# Patient Record
Sex: FEMALE | Race: WHITE | URBAN - METROPOLITAN AREA
[De-identification: names, ages, dates, MRNs, and addresses within clinical notes are randomized per-mention and may not be internally consistent; named-entity substitution may affect disease eponyms.]

---

## 2019-04-18 ENCOUNTER — HISTORICAL (OUTPATIENT)
Dept: ADMINISTRATIVE | Facility: HOSPITAL | Age: 64
End: 2019-04-18

## 2019-05-30 ENCOUNTER — HISTORICAL (OUTPATIENT)
Dept: ADMINISTRATIVE | Facility: HOSPITAL | Age: 64
End: 2019-05-30

## 2019-07-11 ENCOUNTER — HISTORICAL (OUTPATIENT)
Dept: ADMINISTRATIVE | Facility: HOSPITAL | Age: 64
End: 2019-07-11

## 2019-08-20 ENCOUNTER — HISTORICAL (OUTPATIENT)
Dept: ADMINISTRATIVE | Facility: HOSPITAL | Age: 64
End: 2019-08-20

## 2021-10-13 ENCOUNTER — HISTORICAL (OUTPATIENT)
Dept: RADIOLOGY | Facility: HOSPITAL | Age: 66
End: 2021-10-13

## 2022-04-10 ENCOUNTER — HISTORICAL (OUTPATIENT)
Dept: ADMINISTRATIVE | Facility: HOSPITAL | Age: 67
End: 2022-04-10

## 2022-04-26 VITALS
SYSTOLIC BLOOD PRESSURE: 127 MMHG | WEIGHT: 133.38 LBS | DIASTOLIC BLOOD PRESSURE: 78 MMHG | BODY MASS INDEX: 23.63 KG/M2 | OXYGEN SATURATION: 99 % | HEIGHT: 63 IN

## 2022-05-02 NOTE — HISTORICAL [facility]ER
This is a historical note converted from Leigh. Formatting and pictures may have been removed.  Please reference Leigh for original formatting and attached multimedia. Chief Complaint  Est patient here with Low back pain x1 week after bending down wrong and having radiating pain into leg. Better now. Xrays today. Has had back pain in past.  History of Present Illness  65yo?RHD?female?non-smoker hx of hypothyoidism and osteoporosis?p[facility]?for?initial?visit?for low back pain?x 1wk.??She bent over wrong and has some radiating pain to the R?lower leg posteriorly.? She has had a similar episode in the past with numbness into her toes resolved with exercise and Advil.? She states she is much better over the past week and has almost completely resolved.? Corin bowel or bladder incont.? No saddle anesthesia.  DOI: 1wk  Occupation: gardening is her hobby  CRAIG: as above  Previously seen by: Dr. PADDY Morgan for adhesive capsulitis, went to [facility] and was given Celebrex, Flexeril, and medrol dose kanwal  Previous treatment:?Celebrex with moderate relief  Previous injuries:?denies  Fam Hx of Arthritis:?no  Current pain level: ?3/10,?sharp in AM, worsened with use, and alleviated with walking and standing up.  Associated Symptoms:?no numbness or tingling;?no swelling;?no skin changes:?no weakness;?no decrease in ROM  Review of Systems  Constitutional: no fever, no chills, no weight loss  CV: no swelling, no edema  Resp: no SOB, wheezing  GI: no urinary retention, no urinary incontinence  : no fecal incontinence  Skin: no rash, no wound  Neuro: no numbness/tingling, no weakness, no saddle anesthesia  MSK: as above  Psych: no depression, no anxiety  Heme/Lymph: no easy bruising, no easy bleeding, no lymphadenopathy  Immuno: no MRSA history  Physical Exam  Vitals & Measurements  BP:?128/91?  HT:?160?cm? WT:?62.77?kg? BMI:?24.52?  Inspection: no swelling, no erythema, no rashes  ROM: FROM active?and  passive in flexion, ext, SB, Rot  Palpation:  Lumbar: TTP at paraspinal mm. ?left  Sensation: NVI L2-S1  ?   Special Tests:  Seated Straight Leg: Right-neg; Left:+  Supine Straight Leg Raise: Right-neg; Left:+  Slump Straight Leg: Right-neg; Left:neg  MAREK: Right-neg; Left:neg  FADIR: Right-neg; Left:neg  Moses Test: Right-neg; Left:neg  Obers: Right-neg; Left:neg  ?  General: well developed;well nourished; cooperative  PSYCH: alert and oriented x 3?with?appropriate mood and affect  SKIN: inspection and palpation of skin and soft tissue normal; no scars noted on upper/lower extremities  CV: vascular integrity noted; +2 symmetrical pulses, no edema  ABDOMEN:?soft, nontender, ND, + BS x 4, no palpable masses  NEURO: sensation intact by light touch; DTRs +2 bilateral and symmetrical  LYMPH: no LAD noted  Assessment/Plan  Low back pain?M54.5  - Radiological studies ordered and reviewed by me, interpretation attached  -?Advised to start doing back exercises with PT as she is already seeing them for R adhesive capsulitis  - Activity as tolerated  - PT x 12wks, 3 times weekly, HEP,?cont?NSAIDs/Tyl prn, Ice/Heat prn  - Follow up in 2months for repeat assessment and possible OMT  Ordered:  XR Spine Lumbar Complete W Flexion/Ext, Routine, 05/30/19 7:59:00 CDT, Other (please specify), None, Ambulatory, Rad Type, Low back pain, Not Scheduled, 05/30/19 7:59:00 CDT  ?   Problem List/Past Medical History  Ongoing  Adhesive capsulitis of right shoulder  Osteoporosis  Historical  No qualifying data  Procedure/Surgical History  Stapedectomy (2000)  Thyroidectomy (2000)   Medications  acetaminophen-hydrocodone 325 mg-10 mg oral tablet, 1 tab(s), Oral, QID,? ?Not taking  CeleBREX 200 mg oral capsule, 200 mg= 1 cap(s), Oral, Daily  cyclobenzaprine 10 mg oral tablet, 10 mg= 1 tab(s), Oral, TID, PRN  levothyroxine 112 mcg (0.112 mg) oral tablet, 112 mcg= 1 tab(s), Oral, Daily  levothyroxine 125 mcg (0.125 mg) oral tablet, 125 mcg= 1  tab(s), Oral, Daily,? ?Not taking: Last Dose Date/Time Unknown. Changed dosage  Medrol Dosepak 4 mg oral tablet, 1 packet(s), Oral, As Directed,? ?Not Taking, Completed Rx  Prolia SC Injection, 60 mg, Subcutaneous, q6mo  Allergies  tetracycline?(Rash)  Social History  Alcohol  Current, Beer, Wine, Liquor, 1-2 times per week, 04/18/2019  Substance Abuse  Never, 04/18/2019  Tobacco  Former smoker, quit more than 30 days ago, No, 05/30/2019  Family History  Cancer: Mother.  Congestive heart disease.: Negative: Mother and Father.  Hypertension.: Mother.  Diagnostic Results  XR L Spine 5/30/19:  No acute fracture or dislocation present.? L5-S1 disc narrowing.

## 2022-05-02 NOTE — HISTORICAL [facility]
This is a historical note converted from Leigh. Formatting and pictures may have been removed.  Please reference Leigh for original formatting and attached multimedia. Chief Complaint  PT HERE FOR RT SHOULDER PAIN PRESENT FOR 4 MONTHS NOW. PT STATES SHE NOTICED PAIN WHEN SHE WAS SWIMMING ONE DAY HOLDING ONTO A BOARD WITH ARMS EXTENDED. X-RAY TODAY.. PT STATES NO PREVIOUS TREATMENT. SHE HAS BEEN DIAGNOSED WITH OSTEOPOROSIS TAKES PROLIA.  History of Present Illness  Pain getting WORST?? Pain right Shoulder laterally ?? No fever ?? No chills  ?? Pain in the right shoulder in the subacromial region ?? In the supraspinatus region ?? When actively and passively moved ?? ?? Started gradually ?? Slowly worsens with extended activity ?? Worsens at night ?? Causing an inability to sleep ?? Causes difficulty lying on affected side ?? Feels better after rest ?? Not relieved by medication ?? Shoulder joint stiffness on the right ?? joint stiffness ???? No radicular arm pain ?? No right sternoclavicular joint pain ?? No right shoulder joint swelling ?? No pain in the acromioclavicular  ? ?? No tingling of the right shoulder ?? No right shoulder numbness  ?? Range of motion was abnormal difficulty reaching over head using right arm ?? Range of motion was abnormal difficulty combing hair using right arm ?? Range of motion was abnormal difficulty taking shirt on/off using right arm  ?? No neck pain on right Percervical pain ?? Not in the trapezius Trapezius pain  Pain is significantly effecting quality of life  Review of Systems  Review of Systems?  ?????Constitutional: ?No fever, No chills. ?  ?????Respiratory: ?No shortness of breath, No cough. ?  ?????Cardiovascular: ?No chest pain. ?  ?????Gastrointestinal: ?No nausea, No vomiting, No diarrhea, No constipation, No heartburn. ?  ?????Genitourinary: ?No dysuria, No hematuria. ?  ?????Hematology/Lymphatics: ?No bleeding tendency. ?  ?????Endocrine: ?No polyuria.  ?  ?????Neurologic: ?Alert and oriented X4, No numbness, No tingling. ?  ?????Psychiatric: ?No anxiety, No depression. ?  ?????Integumentary: no abnormalities except as noted in history of present illness  Physical Exam  Vitals & Measurements  HR:?78(Peripheral)? BP:?130/91?  HT:?160?cm? WT:?60.78?kg? BMI:?23.74?  ?????????  PHYSICAL FINDING  ??  Neck:  ??NOTED??Pain radiating to the shoulder Precervical  ???  Musculoskeletal:  ??NO?swelling of the right acromioclavicular joint.  ??NO?swelling of the left acromioclavicular joint.  ???  General Appearance:  ??In?NO?acute distress.  ???  Eyes:  General/bilateral:  Sclera:???Normal.  ???  Ears:  General/bilateral:  Outer Ear:???Normal.  ???  Lungs:  ??Respiratory excursion normal and symmetric.  ???  Cardiovascular:  Heart Rate and Rhythm:???Normal.  Arterial Pulses: ? Ulnar pulses 1+ right. ? Radial pulse 1+ right. ? Radial pulse 1+ left.  ???  Abdomen:  ??Normal.  ???  ???  Musculoskeletal System:  ???  Shoulder:  ?   ??NO atrophy of the deltoid muscle  ??NO atrophy of the supraspinatus muscle  ??NO atrophy of the infraspinatus muscle  ?  ???  Right Shoulder:??. ? Acromial tenderness on palpation. ? Tenderness on palpation of the subacromial bursa. ? Tenderness on palpation of the deltoid muscle. ? Tenderness on palpation of the supraspinatus muscle. ? Tenderness on palpation of the infraspinatus muscle. ? Tenderness on palpation of the glenohumeral joint region. ? Tenderness on palpation at the bicipital groove. ? Tenderness on palpation of the trapezius muscle. ? Subacromial crepitus on motion was noted.  ???  Right Shoulder:  ???  Shoulder Motion:??????????????Value?????????Normal Range  ???  Active abduction????????????????80 degrees  Active forward flexion????????????????140 degrees  Active external rotation???????????????30 degrees  Active internal rotation???????????????20 degrees  ???  ??NO swelling medial sternoclavicular.  ??NO swelling.  ??NO  induration.  ??NO erythema.  ??NO long head biceps deformity.  ??winged scapula on the right  ??Motion was abnormal.  ??pain was elicited during a Neer impingement test.  ? pain was elicited during a Montemayor-Igor impingement test.  ??????  ???????????????????????????????????????????????????????????????????????????????  Clavicle:  ???  General/bilateral:???Acromioclavicular joint showed NO pain elicited by motion distal right.  ???  Right Clavicle:?? Right sternoclavicular joint showed tenderness on palpation. ? Right acromioclavicular joint showed tenderness on palpation.  ???  Left Clavicle:?? Left sternoclavicular joint showed tenderness on palpation. ? Left acromioclavicular joint showed tenderness on palpation.  ???  ???  Neurological:  ???  ? NO abnormalities were noted Sensibility intact distally on right.  ??Oriented to time, place, and person.  ???  Sensation:  ??NO sensory exam abnormalities were noted Decreased median sensation.  ??NO sensory exam abnormalities were noted Decreased ulnar sensation.  ??NO sensory exam abnormalities were noted Decreased radial sensation.  ???  Motor (Strength):  ? weakness of the right shoulder was observed.  ??????????????????????????????????????????????????????????????????????????????  Skin:  ??NO ecchymosis on the shoulders left.  ??NO ecchymosis on the shoulders right.  ???  Injury / Incision Site:???NO scar.  ???  TESTS  ???  Imaging:  ???  X-Ray Shoulder:?Complete, two or more views of the true AP of the glenohumeral joint were performed??????????????????????????????????????????????????????????????????????????????????  NO radiographic evidence of any osteoarticular abnormality????????????????????????????????????????????????????????????????????????????????????????????????????????????????????????????????????????????????????????????  ?   ASSESSMENT  ?   ? Partial tear of rotator cuff tendon  ? Adhesive capsulitis of shoulder  ???  ???  PLAN  ?   ? Physical therapy  service  ? Home exercises  ?  NSAIDS  right shoulder injection  Administered corticosteroid injection?? ? 2cc cortisone and 2cc lidocaine using sterile technique after informed verbal consent. Risks discussed with patient prior to injection. Informed the patient of the following:  -?Explained to patient that this injection in some patients will experience increased pain a few minutes after the injection and that the pain will last anywhere from 10 to 20 minutes. In order to decrease the pain you need to do the following:  ?Make sure to move the extremity in which the injection was given. If you do not move the medication sits there and can cause more pain.  Ice the affected joint every 2 hours for 20 minutes at a time for the next 24 hours.  ?If you are not already taking an anti-inflammatory take the following Ibuprofen/ Advil take 3 to 4 tablets every 6 to 8 hours or 2 Aleve every 12 hours for the next 5 days to help the medication work. If you cannot take anti-inflammatory take 2 extra strength Tylenol every 6 hours for the next 5 days.  ??Patient voiced understanding ?????????????????????????????????????????????????????????????????????????????  Assessment/Plan  1.?Adhesive capsulitis of right shoulder?M75.01  Ordered:  betamethasone, 12 mg, Intra-Articular, Once, first dose 04/18/19 10:00:00 CDT, stop date 04/18/19 10:00:00 CDT  Lidocaine inj., 2 mL, Intra-Articular, Once, first dose 04/18/19 9:15:00 CDT, stop date 04/18/19 9:15:00 CDT  asp/inj jnt/bursa, major [id number] PC, 04/18/19 9:15:00 CDT, [facility]opa[facility]ics Clinic, Routine, 04/18/19 9:15:00 CDT  Clinic Follow up, *Est. 05/21/19 10:00:00 CDT, Order for future visit, Adhesive capsulitis of right shoulder, Orthopaedics  Office/Outpatient Visit Level 3 New [id number] PC, Adhesive capsulitis of right shoulder, Orthopaedics Clinic, 04/18/19 9:14:00 CDT  PT/OT External Referral, 04/18/19 9:15:00 CDT, Adhesive capsulitis of right shoulder, Modalities  No Dry Needling   Stretch and Strengthen  Therapeutic Excercise, 3 X Week, **SHOULDER PT ORDERS**  ?  Orders:  XR Shoulder Right Minimum 2 Views, Routine, 04/18/19 9:02:00 CDT, Pain, None, Ambulatory, Rad Type, Pain in right shoulder, 04/18/19 9:02:00 CDT   Problem List/Past Medical History  Ongoing  Adhesive capsulitis of right shoulder  Osteoporosis  Historical  No qualifying data  Procedure/Surgical History  Stapedectomy (2000)  Thyroidectomy (2000)   Medications  Celestone, 12 mg, Intra-Articular, Once  levothyroxine 125 mcg (0.125 mg) oral tablet, 125 mcg= 1 tab(s), Oral, Daily  lidocaine 2% injectable solution, 2 mL, Intra-Articular, Once  Allergies  tetracycline?(Rash)  Social History  Alcohol  Current, Beer, Wine, Liquor, 1-2 times per week, 04/18/2019  Substance Abuse  Never, 04/18/2019  Tobacco  Former smoker, quit more than 30 days ago, N/A, Started age 18 Years. Stopped age 40 Years., 04/18/2019  Family History  Cancer: Mother.  Congestive heart disease.: Negative: Mother and Father.  Hypertension.: Mother.  Health Maintenance  Health Maintenance  ???Pending?(in the next year)  ??? ??Due?  ??? ? ? ?ADL Screening due??04/18/19??and every 1??year(s)  ??? ? ? ?Alcohol Misuse Screening due??04/18/19??and every 1??year(s)  ??? ? ? ?Aspirin Therapy for CVD Prevention due??04/18/19??and every 1??year(s)  ??? ? ? ?Tetanus Vaccine due??04/18/19??and every 10??year(s)  ??? ? ? ?Zoster Vaccine due??04/18/19??and every 100??year(s)  ???Satisfied?(in the past 1 year)  ??? ??Satisfied?  ??? ? ? ?Blood Pressure Screening on??04/18/19.??Satisfied by Gissel Pierre LPN  ??? ? ? ?Body Mass Index Check on??04/18/19.??Satisfied by Gissel Pierre LPN  ??? ? ? ?Cervical Cancer Screening on??07/16/18.??Satisfied by Breanna Orozco  ??? ? ? ?Obesity Screening on??04/18/19.??Satisfied by Gissel Pierre LPN  ?  ?

## 2022-05-02 NOTE — HISTORICAL [facility]
This is a historical note converted from Leigh. Formatting and pictures may have been removed.  Please reference Leigh for original formatting and attached multimedia. Chief Complaint  c/o bilateral shoulder pain, left shoulder started hurting about 3 weeks ago, denies any prior sx or injury, xrays today....sm  History of Present Illness  ????  ? Pain getting WORST? Pain left Shoulder laterally ? No fever ? No chills  ? Pain in the left shoulder in the subacromial region ? In the supraspinatus region ? When actively and passively moved ? ? Started gradually ? Slowly worsens with extended activity ? Worsens at night ? Causing an inability to sleep ? Causes difficulty lying on affected side ? Feels better after rest ? Not relieved by medication ? Shoulder joint stiffness on the left ? joint stiffness ? ?? No radicular arm pain ? No left sternoclavicular joint pain ? No left shoulder joint swelling ? No pain in the acromioclavicular  ? No tingling of the left shoulder ? No left shoulder numbness  ? Range of motion was abnormal difficulty reaching over head using left arm ? Range of motion was abnormal difficulty combing hair using left arm ? Range of motion was abnormal difficulty taking shirt on/off using left arm  ? No neck pain on left Precervical pain ? Not in the trapezius Trapezius pain  Pain is significantly effecting quality of life  ?  she had a previous right frozen shoulder in the recent past  Review of Systems  Review of Systems?  ?????Constitutional: ?No fever, No chills. ?  ?????Respiratory: ?No shortness of breath, No cough. ?  ?????Cardiovascular: ?No chest pain. ?  ?????Gastrointestinal: ?No nausea, No vomiting, No diarrhea, No constipation, No heartburn. ?  ?????Genitourinary: ?No dysuria, No hematuria. ?  ?????Hematology/Lymphatics: ?No bleeding tendency. ?  ?????Endocrine: ?No polyuria. ?  ?????Neurologic: ?Alert and oriented X4, No numbness, No tingling. ?  ?????Psychiatric: ?No anxiety, No  depression. ?  ?????Integumentary: no abnormalities except as noted in history of present illness  Physical Exam  Vitals & Measurements  BP:?127/78?  HT:?160?cm? WT:?60.5?kg? BMI:?23.63?  ?????????  PHYSICAL FINDING  ??  Neck:  ??NOTED??Pain radiating to the shoulder Precervical  ???  Musculoskeletal:  ??NO?swelling of the right acromioclavicular joint.  ??NO?swelling of the left acromioclavicular joint.  ???  General Appearance:  ??In?NO?acute distress.  ???  Eyes:  General/bilateral:  Sclera:???Normal.  ???  Ears:  General/bilateral:  Outer Ear:???Normal.  ???  Lungs:  ??Respiratory excursion normal and symmetric.  ???  Cardiovascular:  Heart Rate and Rhythm:???Normal.  Arterial Pulses: ? Ulnar pulses 1+ right. ? Radial pulse 1+ right. ? Radial pulse 1+ left.  ???  Abdomen:  ??Normal.  ???  ???  Musculoskeletal System:  ???  Shoulder:  ?.  ??NO atrophy of the deltoid muscle  ??NO atrophy of the supraspinatus muscle  ??NO atrophy of the infraspinatus muscle  ?  ?   Left Shoulder:?? . ? Acromial tenderness on palpation. ? Tenderness on palpation of the subacromial bursa. ? Tenderness on palpation of the supraspinatus muscle. ? Tenderness on palpation of the infraspinatus muscle? ? Tenderness on palpation of the trapezius muscle. ? Subacromial crepitus on motion was noted.  ?   ???  ??NO swelling medial sternoclavicular.  ??NO swelling.  ??NO induration.  ??NO erythema.  ??NO long head biceps deformity.  ??NO winged scapula.  ??Motion was normal.  ?? pain was elicited during a Neer impingement test.  ?? pain was elicited during a Montemayor-Igor impingement test.  ??????  Left Shoulder:  ???  Shoulder Motion:??????Normal Range  ???  Active forward flexion????????????????170 degrees  Active external rotation???????????????50 degrees  Active internal rotation???????????????40  degrees??  ???????????????????????????????????????????????????????????????????????????????  Clavicle:  ???  General/bilateral:???Acromioclavicular joint showed NO pain elicited by motion distal right.  ???  Right Clavicle:?? Right sternoclavicular joint showed tenderness on palpation. ? Right acromioclavicular joint showed tenderness on palpation.  ???  Left Clavicle:?? Left sternoclavicular joint showed tenderness on palpation. ? Left acromioclavicular joint showed tenderness on palpation.  ???  ???  Neurological:  ???  ? NO abnormalities were noted Sensibility intact distally on right.  ??Oriented to time, place, and person.  ???  Sensation:  ??NO sensory exam abnormalities were noted Decreased median sensation.  ??NO sensory exam abnormalities were noted Decreased ulnar sensation.  ??NO sensory exam abnormalities were noted Decreased radial sensation.  ???  Motor (Strength):  ??NO weakness of the right shoulder was observed.  ??NO weakness of the left shoulder was observed.  ??????????????????????????????????????????????????????????????????????????????  Skin:  ??NO ecchymosis on the shoulders left.  ??NO ecchymosis on the shoulders right.  ???  Injury / Incision Site:???NO scar.  ???  TESTS  ???  Imaging:  ???  X-Ray Shoulder:?Complete, two or more views of the true AP of the glenohumeral joint were performed??????????????????????????????????????????????????????????????????????????????????  NO radiographic evidence of any osteoarticular abnormality????????????????????????????????????????????????????????????????????????????????????????????????????????????????????????????????????????????????????????????  ?   ASSESSMENT  left RC tendonitis  ? Adhesive capsulitis of shoulder (mild)  ???  ???  PLAN  ?   ? Home exercises  ?NSAIDS  ?   if pain increases she will call for an injection  ?  Assessment/Plan  Left shoulder pain?M25.512  Ordered:  XR Shoulder Left Minimum 2 Views, Routine, 08/20/19 15:19:00 CDT, Pain, None,  Ambulatory, Rad Type, Left shoulder pain, 08/20/19 15:19:00 CDT  ?   Problem List/Past Medical History  Ongoing  Adhesive capsulitis of right shoulder  Lower back pain  Osteoporosis  Radiculitis  Right leg numbness  Right lumbar radiculitis  Historical  No qualifying data  Procedure/Surgical History  Stapedectomy (2000)  Thyroidectomy (2000)   Medications  acetaminophen-hydrocodone 325 mg-10 mg oral tablet, 1 tab(s), Oral, QID,? ?Not taking  CeleBREX 200 mg oral capsule, 200 mg= 1 cap(s), Oral, BID,? ?Not taking: duplicate  CeleBREX 200 mg oral capsule, 200 mg= 1 cap(s), Oral, Daily  levothyroxine 112 mcg (0.112 mg) oral tablet, 112 mcg= 1 tab(s), Oral, Daily  levothyroxine 125 mcg (0.125 mg) oral tablet, 125 mcg= 1 tab(s), Oral, Daily,? ?Not taking: Last Dose Date/Time Unknown. Changed dosage  Medrol Dosepak 4 mg oral tablet, 1 packet(s), Oral, As Directed,? ?Not Taking, Completed Rx  Prolia SC Injection, 60 mg, Subcutaneous, q6mo  Allergies  tetracycline?(Rash)  Social History  Abuse/Neglect  No, 06/15/2019  No, 06/12/2019  Alcohol  Current, Beer, Wine, Liquor, 1-2 times per week, 04/18/2019  Substance Use  Never, 04/18/2019  Tobacco  Former smoker, quit more than 30 days ago, N/A, 08/20/2019  Family History  Cancer: Mother.  Congestive heart disease.: Negative: Mother and Father.  Hypertension.: Mother.  Health Maintenance  Health Maintenance  ???Pending?(in the next year)  ??? ??OverDue  ??? ? ? ?Alcohol Misuse Screening due??01/01/19??and every 1??year(s)  ??? ??Due?  ??? ? ? ?ADL Screening due??08/20/19??and every 1??year(s)  ??? ? ? ?Aspirin Therapy for CVD Prevention due??08/20/19??and every 1??year(s)  ??? ? ? ?Breast Cancer Screening due??08/20/19??and every?  ??? ? ? ?Colorectal Screening due??08/20/19??and every?  ??? ? ? ?Depression Screening due??08/20/19??and every?  ??? ? ? ?Diabetes Screening due??08/20/19??and every?  ??? ? ? ?Influenza Vaccine due??08/20/19??and every?  ??? ? ? ?Lipid Screening  due??08/20/19??and every?  ??? ? ? ?Tetanus Vaccine due??08/20/19??and every 10??year(s)  ??? ? ? ?Zoster Vaccine due??08/20/19??and every 100??year(s)  ??? ??Due In Future?  ??? ? ? ?Obesity Screening not due until??01/01/20??and every 1??year(s)  ??? ? ? ?Blood Pressure Screening not due until??07/17/20??and every 1??year(s)  ??? ? ? ?Body Mass Index Check not due until??07/17/20??and every 1??year(s)  ???Satisfied?(in the past 1 year)  ??? ??Satisfied?  ??? ? ? ?Blood Pressure Screening on??08/20/19.??Satisfied by Shaniqua Coronel LPN  ??? ? ? ?Body Mass Index Check on??08/20/19.??Satisfied by Shaniqua Coronel LPN  ??? ? ? ?Cervical Cancer Screening on??07/31/19.??Satisfied by Candice Rhoades  ??? ? ? ?Obesity Screening on??08/20/19.??Satisfied by Shaniqua Coronel LPN  ?      diagnosis: left shoulder rotator cuff tendonitis